# Patient Record
Sex: FEMALE | Race: OTHER | HISPANIC OR LATINO | Employment: FULL TIME | URBAN - METROPOLITAN AREA
[De-identification: names, ages, dates, MRNs, and addresses within clinical notes are randomized per-mention and may not be internally consistent; named-entity substitution may affect disease eponyms.]

---

## 2017-09-20 ENCOUNTER — GENERIC CONVERSION - ENCOUNTER (OUTPATIENT)
Dept: OTHER | Facility: OTHER | Age: 37
End: 2017-09-20

## 2017-11-06 ENCOUNTER — ALLSCRIPTS OFFICE VISIT (OUTPATIENT)
Dept: OTHER | Facility: OTHER | Age: 37
End: 2017-11-06

## 2017-11-07 NOTE — PROGRESS NOTES
Assessment  1  Encounter for gynecological examination without abnormal finding (V72 31) (Z01 419)    Plan  Encounter for gynecological examination without abnormal finding    · Call [phone] if: You find a new or different kind of lump in your breast ;  Status:Complete;   Done: [id number] 02:52PM   Ordered;For:Encounter for gynecological examination without abnormal finding; Ordered By:Maria Del Carmen Molina;   · Eat a normal well-balanced diet ; Status:Complete;   Done: 66IGW2083 02:52PM   Ordered;For:Encounter for gynecological examination without abnormal finding; Ordered By:Maria Del Carmen Molina;   · Vitamins can help you get daily requirements that your diet may not be giving you ;  Status:Complete;   Done: [id number] 02:52PM   Ordered;For:Encounter for gynecological examination without abnormal finding; Ordered By:Maria Del Carmen Molina;   · We encourage all of our patients to exercise regularly  30 minutes of exercise or physical  activity five or more days a week is recommended for children and adults ;  Status:Complete;   Done: [id number] 02:52PM   Ordered;For:Encounter for gynecological examination without abnormal finding; Ordered By:Maria Del Carmen Molina;   · We recommend that you follow these steps to lower your risk of osteoporosis  ;  Status:Complete;   Done: 55UFT0512 02:52PM   Ordered;For:Encounter for gynecological examination without abnormal finding; Ordered By:Maria Del Carmen Molina;    Discussion/Summary  healthy adult female next cervical cancer screening is due 2021   The patient has the current Goals: Healthy lifestyle  The patent has the current Barriers: None  Patient is able to Self-Care  PATIENT EDUCATION RECORD She was given the following educational materials:  age appropriate care guide  The treatment plan was reviewed with the patient/guardian   The patient/guardian understands and agrees with the treatment plan     Self Referrals: No      Chief Complaint  Patient here for yearly exam       History of Present Illness  GYN HM, Adult Female [facility]: The patient is being seen for a gynecology evaluation  The last health maintenance visit was 1 year(s) ago  Social History: Household members include spouse,-- 1 daughter(s),-- 1 son(s)-- and-- 12 and 6  She is   Work status: working full time-- and-- occupation: teacher, [employer]  General Health: The patient's health since the last visit is described as good  Lifestyle:  She does not exercise regularly  -- She does not use tobacco -- She consumes alcohol -- She denies drug use  Reproductive health:  she reports no menstrual problems  Menstrual history: LMP: the last menstrual period was 10/15/17 -- she uses contraception  For contraception, she has had a tubal occlusion  -- she is sexually active  -- pregnancy history: G 4P 2  Screening: Cervical cancer screening includes a pap smear performed 8/30/16, neg-- and-- human papilloma virus screening performed 8/30/16, neg  Review of Systems  no pelvic pain,-- no pelvic pressure,-- no vaginal pain,-- no vaginal discharge,-- no vaginal itching,-- no vaginal lump or mass,-- no vaginal odor,-- no dysmenorrhea,-- no menorrhagia,-- no dysuria-- and-- no urinary loss of control  Additional findings include had two months were cycles were 35-45 days apart, now normalized  Constitutional: No fever, no chills, feels well, no tiredness, no recent weight gain or loss  ENT: no ear ache, no loss of hearing, no nosebleeds or nasal discharge, no sore throat or hoarseness  Cardiovascular: no complaints of slow or fast heart rate, no chest pain, no palpitations, no leg claudication or lower extremity edema  Respiratory: no complaints of shortness of breath, no wheezing, no dyspnea on exertion, no orthopnea or PND  Breasts: no complaints of breast pain, breast lump or nipple discharge     Gastrointestinal: no complaints of abdominal pain, no constipation, no nausea or diarrhea, no vomiting, no bloody stools  Genitourinary: no complaints of dysuria, no incontinence, no pelvic pain, no dysmenorrhea, no vaginal discharge or abnormal vaginal bleeding  Musculoskeletal: no complaints of arthralgia, no myalgia, no joint swelling or stiffness, no limb pain or swelling  Integumentary: no complaints of skin rash or lesion, no itching or dry skin, no skin wounds  Neurological: no complaints of headache, no confusion, no numbness or tingling, no dizziness or fainting  Active Problems  1  Encounter for gynecological examination without abnormal finding (V72 31) (Z01 419)   2  Screening for HPV (human papillomavirus) (V73 81) (Z11 51)   3  Urinary frequency (788 41) (R35 0)    Surgical History   · History of Tubal Ligation    Family History  Mother    · Family history of malignant neoplasm of breast (V16 3) (Z80 3)    Social History   · Never a smoker    Current Meds   1  No Reported Medications Recorded    Allergies  1  No Known Drug Allergies    Vitals   Recorded: [id number] 02:33PM Recorded: 23PFL7699 27:40YH   Systolic 495    Diastolic 74    Height  5 ft 5 5 in   Weight  200 lb    BMI Calculated  32 78   BSA Calculated  1 99   LMP 15Oct2017      Physical Exam    Constitutional   General appearance: No acute distress, well appearing and well nourished  Genitourinary   External genitalia: Normal and no lesions appreciated  Vagina: Normal, no lesions or dryness appreciated  Urethra: Normal     Urethral meatus: Normal     Bladder: Normal, soft, non-tender and no prolapse or masses appreciated  Cervix: Normal, no palpable masses  Uterus: Normal, non-tender, not enlarged, and no palpable masses  Adnexa/parametria: Normal, non-tender and no fullness or masses appreciated  Chest   Breasts: Normal and no dimpling or skin changes noted         Future Appointments    Date/Time Provider Specialty Site   11/07/2018 09:30 AM Raven Shah MD Obstetrics/Gynecology [facility]     Signatures   Electronically signed by : Mayela Potts MD; Nov 6 2017  2:53PM EST                       (Author)

## 2018-01-12 NOTE — MISCELLANEOUS
Message   Recorded as Task   Date: 09/20/2017 09:23 AM, Created By: Marsha Abdi   Task Name: Follow Up   Assigned To: Yvonne Pyle   Regarding Patient: Nicole Dejesus, Status: In Progress   Myrnamikhail Spring - 20 Sep 2017 9:23 AM     TASK CREATED  Caller: Self; General Medical Question; [phone] (Mobile Phone)  Patient has appt with [facility] on 11/06/2017 for yearly  Patient does have some concerns regarding late periods for last two months  Please call to discuss  Best time to reach patient is after 3:15pm   Vincent Angela - 20 Sep 2017 10:30 AM     TASK IN PROGRESS   Vincent Angela - 20 Sep 2017 3:54 PM     TASK EDITED  Pt had tubal  Pt has regular cycles but periods are delayed till 34 or 35 days for past 3 mos  I told her to monitor cycles till yly in Nov  Nothing to do at this time   Active Problems    1  Encounter for gynecological examination without abnormal finding (V72 31) (Z01 419)   2  Screening for HPV (human papillomavirus) (V73 81) (Z11 51)   3  Urinary frequency (788 41) (R35 0)    Current Meds   1  No Reported Medications Recorded    Allergies    1  No Known Drug Allergies    Signatures   Electronically signed by :  Darleen Stovall, ; Sep 20 2017  3:54PM EST                       (Author)

## 2018-01-12 NOTE — RESULT NOTES
Message   Recorded as Task   Date: 09/02/2016 02:34 PM, Created By: Shanelle Tam   Task Name: Verify Patient Results   Assigned To: Sil Sexton   Regarding Patient: Janice Camacho, Status: Active   CommentDebbora Ebonie - 02 Sep 2016 2:34 PM        Sil Sexton - 06 Sep 2016 1:02 PM     TASK EDITED  Card sent that pap was normal        Signatures   Electronically signed by : Linda Guidry CNM;  Sep  6 2016  1:02PM EST                       (Author)

## 2018-01-13 VITALS
BODY MASS INDEX: 32.14 KG/M2 | WEIGHT: 200 LBS | HEIGHT: 66 IN | SYSTOLIC BLOOD PRESSURE: 118 MMHG | DIASTOLIC BLOOD PRESSURE: 74 MMHG

## 2018-12-28 ENCOUNTER — TELEPHONE (OUTPATIENT)
Dept: OBGYN CLINIC | Age: 38
End: 2018-12-28

## 2018-12-28 DIAGNOSIS — Z12.31 ENCOUNTER FOR SCREENING MAMMOGRAM FOR MALIGNANT NEOPLASM OF BREAST: Primary | ICD-10-CM

## 2018-12-28 NOTE — TELEPHONE ENCOUNTER
Pt needs order for Mammogram   Pt had to reschedule her appointment for today  She is on her cycle  Please advise

## 2019-02-18 ENCOUNTER — HOSPITAL ENCOUNTER (OUTPATIENT)
Dept: MAMMOGRAPHY | Facility: CLINIC | Age: 39
Discharge: HOME | End: 2019-02-18
Payer: COMMERCIAL

## 2019-02-18 VITALS — WEIGHT: 205 LBS | BODY MASS INDEX: 34.16 KG/M2 | HEIGHT: 65 IN

## 2019-02-18 DIAGNOSIS — Z12.31 ENCOUNTER FOR SCREENING MAMMOGRAM FOR MALIGNANT NEOPLASM OF BREAST: ICD-10-CM

## 2019-02-18 PROCEDURE — 77067 SCR MAMMO BI INCL CAD: CPT

## 2019-02-18 PROCEDURE — 77063 BREAST TOMOSYNTHESIS BI: CPT

## 2019-03-04 ENCOUNTER — TELEPHONE (OUTPATIENT)
Dept: OBGYN CLINIC | Facility: CLINIC | Age: 39
End: 2019-03-04

## 2019-03-04 NOTE — TELEPHONE ENCOUNTER
----- Message from Joselyn Conklin MD sent at 3/4/2019 10:36 AM EST -----  Please call Iw[facility]  Additional imaging required

## 2019-03-05 NOTE — TELEPHONE ENCOUNTER
ORESTESOM for pt to cb  Mailed letter to the pt that we are trying to contact her in regards to results

## 2019-03-06 ENCOUNTER — HOSPITAL ENCOUNTER (OUTPATIENT)
Dept: MAMMOGRAPHY | Facility: CLINIC | Age: 39
Discharge: HOME | End: 2019-03-06
Payer: COMMERCIAL

## 2019-03-06 ENCOUNTER — ANNUAL EXAM (OUTPATIENT)
Dept: OBGYN CLINIC | Facility: CLINIC | Age: 39
End: 2019-03-06
Payer: COMMERCIAL

## 2019-03-06 ENCOUNTER — HOSPITAL ENCOUNTER (OUTPATIENT)
Dept: ULTRASOUND IMAGING | Facility: CLINIC | Age: 39
Discharge: HOME | End: 2019-03-06
Payer: COMMERCIAL

## 2019-03-06 VITALS
WEIGHT: 205 LBS | SYSTOLIC BLOOD PRESSURE: 120 MMHG | BODY MASS INDEX: 32.95 KG/M2 | HEIGHT: 66 IN | DIASTOLIC BLOOD PRESSURE: 76 MMHG

## 2019-03-06 DIAGNOSIS — Z01.419 ENCOUNTER FOR GYNECOLOGICAL EXAMINATION WITHOUT ABNORMAL FINDING: Primary | ICD-10-CM

## 2019-03-06 DIAGNOSIS — Z12.31 ENCOUNTER FOR SCREENING MAMMOGRAM FOR MALIGNANT NEOPLASM OF BREAST: ICD-10-CM

## 2019-03-06 DIAGNOSIS — R92.8 ABNORMAL MAMMOGRAM: ICD-10-CM

## 2019-03-06 PROCEDURE — 99396 PREV VISIT EST AGE 40-64: CPT | Performed by: NURSE PRACTITIONER

## 2019-03-06 PROCEDURE — 77065 DX MAMMO INCL CAD UNI: CPT

## 2019-03-06 PROCEDURE — G0279 TOMOSYNTHESIS, MAMMO: HCPCS

## 2019-03-06 NOTE — PROGRESS NOTES
Diagnoses and all orders for this visit:    Encounter for gynecological examination without abnormal finding    Encounter for screening mammogram for malignant neoplasm of breast  -     Mammo screening bilateral w 3d & cad; Future          Calcium/vit d inclusion in the diet discussed, call with any issues, SBE reinforced, all concerns addressed  Pleasant 37 y o  premenopausal female here for annual exam  She denies any issues with bleeding or her menses  Reports regular cycles  Denies history of abnormal pap smears  Last Pap 2016, no pap today  Denies vaginal issues  Denies pelvic pain  BTL hx  Sexually active without any concerns  Past Medical History:   Diagnosis Date    No known health problems      Past Surgical History:   Procedure Laterality Date     SECTION      TUBAL LIGATION       Family History   Problem Relation Age of Onset    Breast cancer Mother 61    Colon cancer Neg Hx     Ovarian cancer Neg Hx     Uterine cancer Neg Hx     Cervical cancer Neg Hx      Social History     Tobacco Use    Smoking status: Never Smoker    Smokeless tobacco: Never Used   Substance Use Topics    Alcohol use: Yes     Frequency: Monthly or less    Drug use: Never     No current outpatient medications on file    Patient Active Problem List    Diagnosis Date Noted    Encounter for gynecological examination without abnormal finding 2019    Encounter for screening mammogram for malignant neoplasm of breast 2019       No Known Allergies    OB History    Para Term  AB Living   4 2 2   2 2   SAB TAB Ectopic Multiple Live Births   1       2      # Outcome Date GA Lbr Javier/2nd Weight Sex Delivery Anes PTL Lv   4 AB            3 SAB            2 Term            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    Vitals:    19 1428   BP: 120/76   BP Location: Right arm   Patient Position: Sitting   Weight: 93 kg (205 lb)   Height: 5' 6" (1 676 m)     Body mass index is 33 09 kg/m²  Review of Systems   Constitutional: Negative for chills, fatigue, fever and unexpected weight change  Respiratory: Negative for shortness of breath  Gastrointestinal: Negative for anal bleeding, blood in stool, constipation and diarrhea  Genitourinary: Negative for difficulty urinating, dysuria and hematuria  Physical Exam   Constitutional: She appears well-developed and well-nourished  No distress  HENT:   Head: Normocephalic  Neck: Normal range of motion  Neck supple  Pulmonary: Effort normal   Breasts: bilateral without masses, skin changes or nipple discharge  Bilaterally soft and warm to touch  No areas of erythema or pain  Abdominal: Soft  Pelvic exam was performed with patient supine  No labial fusion  There is no rash, tenderness, lesion or injury on the right labia  There is no rash, tenderness, lesion or injury on the left labia  Uterus is not deviated, not enlarged, not fixed and not tender  Cervix exhibits no motion tenderness, no discharge and no friability  Right adnexum displays no mass, no tenderness and no fullness  Left adnexum displays no mass, no tenderness and no fullness  No erythema or tenderness in the vagina  No foreign body in the vagina  No signs of injury around the vagina  No vaginal discharge found  Lymphadenopathy:        Right: No inguinal adenopathy present  Left: No inguinal adenopathy present         USE LONG SPEC

## 2019-03-06 NOTE — PATIENT INSTRUCTIONS
Calcium/Vitamin D Supplement (By mouth)   Calcium (RADU-see-um), Vitamin D (VYE-ta-min D)  Supplies your body with calcium if you need more than you get in your diet  Calcium helps prevent osteoporosis (weak or brittle bones)  Vitamin D helps your body use the calcium  Calcium and vitamin D are minerals that your body needs to work properly  Brand Name(s): Aki-Citrate, Aki-Citrate Plus Vitamin D, Calcet Petites, Calcium 600MG+D, Calcium Citrate +D3 Maximum, Caltrate 600 + D, Citracal + D, Citracal Calcium Citrate Petites with Vitamin D, Citracal Petites, Citracal Ultradense Calcium Citrate, Citracal Ultradense Calcium Citrate Petite w/Vit D, Citrus Calcium with Vitamin D, D-1000, D-2000, D3-400IU   There may be other brand names for this medicine  When This Medicine Should Not Be Used: You should not use this medicine if you have had an allergic reaction to calcium or vitamin D (ergocalciferol)  How to Use This Medicine:   Tablet, Long Acting Tablet, Fizzy Tablet, Liquid Filled Capsule, Chewable Tablet  · Your doctor will tell you how much medicine to use  Do not use more than directed  · Follow the instructions on the medicine label if you are using this medicine without a prescription  Ask your pharmacist or health caregiver if you are not sure how much calcium you should take in one day  · Most calcium supplements should be taken with food, but some kinds of calcium (such as calcium citrate) can be taken with or without food  Ask your health care provider or read the label on the bottle to see if you need to take your specific kind of calcium with food  Drink a full glass of water (8 ounces) with each dose  · If you are using the effervescent (fizzy) tablet, dissolve the tablet in about 6 to 8 ounces of water (3/4 cup to 1 cup)  After the tablet is completely dissolved, drink this mixture right away  Do not save any mixture to take later    · Carefully follow your doctor's instructions about any special diet   · If you need to take more than one dose each a day, take each dose at evenly spaced times, unless your doctor has told you otherwise  If a dose is missed:   · Take a dose as soon as you remember  If it is almost time for your next dose, wait until then and take a regular dose  Do not take extra medicine to make up for a missed dose  How to Store and Dispose of This Medicine:   · Store the medicine in a closed container at room temperature, away from heat, moisture, and direct light  If the effervescent (fizzy) tablet comes packaged in foil, do not open the foil until you are ready to use each tablet  · Ask your pharmacist, doctor, or health caregiver about the best way to dispose of any outdated medicine or medicine no longer needed  · Keep all medicine out of the reach of children  Never share your medicine with anyone  Drugs and Foods to Avoid:   Ask your doctor or pharmacist before using any other medicine, including over-the-counter medicines, vitamins, and herbal products  · Make sure your doctor knows if you are also using other supplements or medicines that contain calcium  Tell your doctor if you are also using gallium nitrate (Ganite®), cellulose sodium phosphate (Calcibind®), or etidronate (Didronel®)  · Calcium can change the way other medicines work if you take them at the same time  If you need to use other medicines, take them at least 2 hours before or 2 hours after you take your calcium supplement  This is particularly important if you are also using phenytoin (Dilantin®) or a tetracycline antibiotic to treat an infection (such as doxycycline, minocycline, Vibramycin®)  · Do not take your calcium supplement with a high-fiber meal (such as bran, whole-grain cereal or bread, fresh fruits)  Do not smoke cigarettes or cigars  Do not drink large amounts of alcohol or caffeine (for example, more than about 8 cups of coffee)    Warnings While Using This Medicine:   · Make sure your doctor knows if you are pregnant or breast feeding, or if you have kidney disease or have ever had kidney stones  Tell your doctor if you have had problems with too much calcium (hypercalcemia) or too little calcium in your blood (hypocalcemia)  Some health problems that can cause hypercalcemia are sarcoidosis, or problems with your parathyroid gland  · You should not use certain brands of this medicine if you have kidney disease or are on dialysis, because they may harm your kidneys  Ask your caregiver what brands are best for you  · Some health problems can affect how much calcium you should take  Tell your doctor if you have stomach or digestion problems, such as on-going diarrhea, not absorbing nutrients properly, or not having enough acid in your stomach  · This medicine might contain phenylalanine (aspartame)  This is only a concern if you have a disorder called phenylketonuria (a problem with amino acids)  If you have this condition, talk to your doctor before using this medicine  · If you are using a large amount of calcium or using it for a long time, your doctor might need to check your blood on a regular basis  Be sure to keep all appointments  Possible Side Effects While Using This Medicine:   Call your doctor right away if you notice any of these side effects:  · Headache that will not go away, dry mouth, loss of appetite, severe constipation  If you notice other side effects that you think are caused by this medicine, tell your doctor  Call your doctor for medical advice about side effects  You may report side effects to FDA at 1-766-FDA-1243  © 2017 [address] Information is for End User's use only and may not be sold, redistributed or otherwise used for commercial purposes  The above information is an  only  It is not intended as medical advice for individual conditions or treatments   Talk to your doctor, nurse or pharmacist before following any medical regimen to see if it is safe and effective for you

## 2020-03-02 DIAGNOSIS — Z12.31 VISIT FOR SCREENING MAMMOGRAM: Primary | ICD-10-CM

## 2020-07-09 ENCOUNTER — HOSPITAL ENCOUNTER (OUTPATIENT)
Dept: MAMMOGRAPHY | Facility: CLINIC | Age: 40
Discharge: HOME | End: 2020-07-09
Payer: COMMERCIAL

## 2020-07-09 VITALS — BODY MASS INDEX: 32.95 KG/M2 | HEIGHT: 66 IN | WEIGHT: 205 LBS

## 2020-07-09 DIAGNOSIS — Z12.31 VISIT FOR SCREENING MAMMOGRAM: ICD-10-CM

## 2020-07-09 PROCEDURE — 77063 BREAST TOMOSYNTHESIS BI: CPT

## 2020-07-09 PROCEDURE — 77067 SCR MAMMO BI INCL CAD: CPT

## 2020-07-13 ENCOUNTER — ANNUAL EXAM (OUTPATIENT)
Dept: OBGYN CLINIC | Facility: CLINIC | Age: 40
End: 2020-07-13
Payer: COMMERCIAL

## 2020-07-13 VITALS
HEIGHT: 66 IN | WEIGHT: 190 LBS | BODY MASS INDEX: 30.53 KG/M2 | SYSTOLIC BLOOD PRESSURE: 120 MMHG | DIASTOLIC BLOOD PRESSURE: 62 MMHG

## 2020-07-13 DIAGNOSIS — Z01.419 ENCOUNTER FOR GYNECOLOGICAL EXAMINATION WITHOUT ABNORMAL FINDING: Primary | ICD-10-CM

## 2020-07-13 DIAGNOSIS — Z12.31 ENCOUNTER FOR SCREENING MAMMOGRAM FOR MALIGNANT NEOPLASM OF BREAST: ICD-10-CM

## 2020-07-13 PROCEDURE — 99396 PREV VISIT EST AGE 40-64: CPT | Performed by: NURSE PRACTITIONER

## 2020-07-13 NOTE — PROGRESS NOTES
Diagnoses and all orders for this visit:    Encounter for gynecological examination without abnormal finding    Encounter for screening mammogram for malignant neoplasm of breast          Calcium/vit d inclusion in the diet discussed, call with any issues, SBE reinforced, all concerns addressed  Pleasant 41 y o  premenopausal female here for annual exam  She denies any issues with bleeding or her menses  Reports regular cycles  Denies history of abnormal pap smears  Last Pap 2016, no pap today  Denies vaginal issues  Denies pelvic pain  BTL hx  Sexually active without any concerns   and monogamous  Past Medical History:   Diagnosis Date    No known health problems      Past Surgical History:   Procedure Laterality Date     SECTION      CHOLECYSTECTOMY      TUBAL LIGATION       Family History   Problem Relation Age of Onset    Breast cancer Mother 61    No Known Problems Maternal Grandmother     No Known Problems Paternal Grandmother     No Known Problems Maternal Aunt     No Known Problems Maternal Aunt     No Known Problems Paternal Aunt     No Known Problems Paternal Aunt     No Known Problems Paternal Aunt     Colon cancer Neg Hx     Ovarian cancer Neg Hx     Uterine cancer Neg Hx     Cervical cancer Neg Hx      Social History     Tobacco Use    Smoking status: Never Smoker    Smokeless tobacco: Never Used   Substance Use Topics    Alcohol use: Yes     Frequency: Monthly or less    Drug use: Never     No current outpatient medications on file    Patient Active Problem List    Diagnosis Date Noted    Encounter for gynecological examination without abnormal finding 2019    Encounter for screening mammogram for malignant neoplasm of breast 2019       No Known Allergies    OB History    Para Term  AB Living   4 2 2   2 2   SAB TAB Ectopic Multiple Live Births   1       2      # Outcome Date GA Lbr Javier/2nd Weight Sex Delivery Anes PTL Lv 4 AB            3 SAB            2 Term            1 Term              [address] [employer]  2 sons 6 and 14 yo    Vitals:    07/13/20 1028   BP: 120/62   BP Location: Right arm   Patient Position: Sitting   Weight: 86 2 kg (190 lb)   Height: 5' 6" (1 676 m)     Body mass index is 30 67 kg/m²  Review of Systems   Constitutional: Negative for chills, fatigue, fever and unexpected weight change  Respiratory: Negative for shortness of breath  Gastrointestinal: Negative for anal bleeding, blood in stool, constipation and diarrhea  Genitourinary: Negative for difficulty urinating, dysuria and hematuria  Physical Exam   Constitutional: She appears well-developed and well-nourished  No distress  HENT:   Head: Normocephalic  Neck: Normal range of motion  Neck supple  Pulmonary: Effort normal   Breasts: bilateral without masses, skin changes or nipple discharge  Bilaterally soft and warm to touch  No areas of erythema or pain  Abdominal: Soft  Pelvic exam was performed with patient supine  No labial fusion  There is no rash, tenderness, lesion or injury on the right labia  There is no rash, tenderness, lesion or injury on the left labia  Uterus is not deviated, not enlarged, not fixed and not tender  Cervix exhibits no motion tenderness, no discharge and no friability  Right adnexum displays no mass, no tenderness and no fullness  Left adnexum displays no mass, no tenderness and no fullness  No erythema or tenderness in the vagina  No foreign body in the vagina  No signs of injury around the vagina  No vaginal discharge found  Lymphadenopathy:        Right: No inguinal adenopathy present  Left: No inguinal adenopathy present       USE LONG SPEC

## 2020-07-13 NOTE — PATIENT INSTRUCTIONS
Calcium/Vitamin D Supplement (By mouth)   Calcium (RADU-see-um), Vitamin D (VYE-ta-min D)  Supplies your body with calcium if you need more than you get in your diet  Calcium helps prevent osteoporosis (weak or brittle bones)  Vitamin D helps your body use the calcium  Calcium and vitamin D are minerals that your body needs to work properly  Brand Name(s): Aki-Citrate, Aki-Citrate Plus Vitamin D, Calcet Petites, Calcium 600MG+D, Calcium Citrate +D3 Maximum, Caltrate 600 + D, Citracal + D, Citracal Calcium Citrate Petites with Vitamin D, Citracal Petites, Citracal Ultradense Calcium Citrate, Citracal Ultradense Calcium Citrate Petite w/Vit D, Citrus Calcium with Vitamin D, D-1000, D-2000, D3-400IU   There may be other brand names for this medicine  When This Medicine Should Not Be Used: You should not use this medicine if you have had an allergic reaction to calcium or vitamin D (ergocalciferol)  How to Use This Medicine:   Tablet, Long Acting Tablet, Fizzy Tablet, Liquid Filled Capsule, Chewable Tablet  · Your doctor will tell you how much medicine to use  Do not use more than directed  · Follow the instructions on the medicine label if you are using this medicine without a prescription  Ask your pharmacist or health caregiver if you are not sure how much calcium you should take in one day  · Most calcium supplements should be taken with food, but some kinds of calcium (such as calcium citrate) can be taken with or without food  Ask your health care provider or read the label on the bottle to see if you need to take your specific kind of calcium with food  Drink a full glass of water (8 ounces) with each dose  · If you are using the effervescent (fizzy) tablet, dissolve the tablet in about 6 to 8 ounces of water (3/4 cup to 1 cup)  After the tablet is completely dissolved, drink this mixture right away  Do not save any mixture to take later    · Carefully follow your doctor's instructions about any special diet   · If you need to take more than one dose each a day, take each dose at evenly spaced times, unless your doctor has told you otherwise  If a dose is missed:   · Take a dose as soon as you remember  If it is almost time for your next dose, wait until then and take a regular dose  Do not take extra medicine to make up for a missed dose  How to Store and Dispose of This Medicine:   · Store the medicine in a closed container at room temperature, away from heat, moisture, and direct light  If the effervescent (fizzy) tablet comes packaged in foil, do not open the foil until you are ready to use each tablet  · Ask your pharmacist, doctor, or health caregiver about the best way to dispose of any outdated medicine or medicine no longer needed  · Keep all medicine out of the reach of children  Never share your medicine with anyone  Drugs and Foods to Avoid:   Ask your doctor or pharmacist before using any other medicine, including over-the-counter medicines, vitamins, and herbal products  · Make sure your doctor knows if you are also using other supplements or medicines that contain calcium  Tell your doctor if you are also using gallium nitrate (Ganite®), cellulose sodium phosphate (Calcibind®), or etidronate (Didronel®)  · Calcium can change the way other medicines work if you take them at the same time  If you need to use other medicines, take them at least 2 hours before or 2 hours after you take your calcium supplement  This is particularly important if you are also using phenytoin (Dilantin®) or a tetracycline antibiotic to treat an infection (such as doxycycline, minocycline, Vibramycin®)  · Do not take your calcium supplement with a high-fiber meal (such as bran, whole-grain cereal or bread, fresh fruits)  Do not smoke cigarettes or cigars  Do not drink large amounts of alcohol or caffeine (for example, more than about 8 cups of coffee)    Warnings While Using This Medicine:   · Make sure your doctor knows if you are pregnant or breast feeding, or if you have kidney disease or have ever had kidney stones  Tell your doctor if you have had problems with too much calcium (hypercalcemia) or too little calcium in your blood (hypocalcemia)  Some health problems that can cause hypercalcemia are sarcoidosis, or problems with your parathyroid gland  · You should not use certain brands of this medicine if you have kidney disease or are on dialysis, because they may harm your kidneys  Ask your caregiver what brands are best for you  · Some health problems can affect how much calcium you should take  Tell your doctor if you have stomach or digestion problems, such as on-going diarrhea, not absorbing nutrients properly, or not having enough acid in your stomach  · This medicine might contain phenylalanine (aspartame)  This is only a concern if you have a disorder called phenylketonuria (a problem with amino acids)  If you have this condition, talk to your doctor before using this medicine  · If you are using a large amount of calcium or using it for a long time, your doctor might need to check your blood on a regular basis  Be sure to keep all appointments  Possible Side Effects While Using This Medicine:   Call your doctor right away if you notice any of these side effects:  · Headache that will not go away, dry mouth, loss of appetite, severe constipation  If you notice other side effects that you think are caused by this medicine, tell your doctor  Call your doctor for medical advice about side effects  You may report side effects to FDA at 1-811-FDA-0858  © 2017 [employer] Information is for End User's use only and may not be sold, redistributed or otherwise used for commercial purposes  The above information is an  only  It is not intended as medical advice for individual conditions or treatments   Talk to your doctor, nurse or pharmacist before following any medical regimen to see if it is safe and effective for you

## 2021-03-10 DIAGNOSIS — Z23 ENCOUNTER FOR IMMUNIZATION: ICD-10-CM

## 2021-07-19 ENCOUNTER — ANNUAL EXAM (OUTPATIENT)
Dept: OBGYN CLINIC | Facility: CLINIC | Age: 41
End: 2021-07-19
Payer: COMMERCIAL

## 2021-07-19 VITALS
WEIGHT: 207 LBS | SYSTOLIC BLOOD PRESSURE: 110 MMHG | HEIGHT: 66 IN | DIASTOLIC BLOOD PRESSURE: 84 MMHG | BODY MASS INDEX: 33.27 KG/M2

## 2021-07-19 DIAGNOSIS — Z12.31 SCREENING MAMMOGRAM, ENCOUNTER FOR: ICD-10-CM

## 2021-07-19 DIAGNOSIS — Z01.419 ENCOUNTER FOR GYNECOLOGICAL EXAMINATION WITHOUT ABNORMAL FINDING: Primary | ICD-10-CM

## 2021-07-19 PROCEDURE — 99396 PREV VISIT EST AGE 40-64: CPT | Performed by: NURSE PRACTITIONER

## 2021-07-19 PROCEDURE — G0145 SCR C/V CYTO,THINLAYER,RESCR: HCPCS | Performed by: NURSE PRACTITIONER

## 2021-07-19 PROCEDURE — G0476 HPV COMBO ASSAY CA SCREEN: HCPCS | Performed by: NURSE PRACTITIONER

## 2021-07-19 NOTE — PATIENT INSTRUCTIONS
Breast Self Exam for Women   AMBULATORY CARE:   A breast self-exam (BSE)  is a way to check your breasts for lumps and other changes  Regular BSEs can help you know how your breasts normally look and feel  Most breast lumps or changes are not cancer, but you should always have them checked by a healthcare provider  Why you should do a BSE:  Breast cancer is the most common type of cancer in women  Even if you have mammograms, you may still want to do a BSE regularly  If you know how your breasts normally feel and look, it may help you know when to contact your healthcare provider  Mammograms can miss some cancers  You may find a lump during a BSE that did not show up on a mammogram   When you should do a BSE:  If you have periods, you may want to do your BSE 1 week after your period ends  This is the time when your breasts may be the least swollen, lumpy, or tender  You can do regular BSEs even if you are breastfeeding or have breast implants  Call your doctor if:   · You find any lumps or changes in your breasts  · You have breast pain or fluid coming from your nipples  · You have questions or concerns about your condition or care  How to do a BSE:       · Look at your breasts in a mirror  Look at the size and shape of each breast and nipple  Check for swelling, lumps, dimpling, scaly skin, or other skin changes  Look for nipple changes, such as a nipple that is painful or beginning to pull inward  Gently squeeze both nipples and check to see if fluid (that is not breast milk) comes out of them  If you find any of these or other breast changes, contact your healthcare provider  Check your breasts while you sit or  the following 3 positions:    ? Hang your arms down at your sides  ? Raise your hands and join them behind your head  ? Put firm pressure with your hands on your hips  Bend slightly forward while you look at your breasts in the mirror  · Lie down and feel your breasts    When you lie down, your breast tissue spreads out evenly over your chest  This makes it easier for you to feel for lumps and anything that may not be normal for your breasts  Do a BSE on one breast at a time  ? Place a small pillow or towel under your left shoulder  Put your left arm behind your head  ? Use the 3 middle fingers of your right hand  Use your fingertip pads, on the top of your fingers  Your fingertip pad is the most sensitive part of your finger  ? Use small circles to feel your breast tissue  Use your fingertip pads to make dime-sized, overlapping circles on your breast and armpits  Use light, medium, and firm pressure  First, press lightly  Second, press with medium pressure to feel a little deeper into the breast  Last, use firm pressure to feel deep within your breast     ? Examine your entire breast area  Examine the breast area from above the breast to below the breast where you feel only ribs  Make small circles with your fingertips, starting in the middle of your armpit  Make circles going up and down the breast area  Continue toward your breast and all the way across it  Examine the area from your armpit all the way over to the middle of your chest (breastbone)  Stop at the middle of your chest     ? Move the pillow or towel to your right shoulder, and put your right arm behind your head  Use the 3 fingertip pads of your left hand, and repeat the above steps to do a BSE on your right breast   What else you can do to check for breast problems or cancer:  Talk to your healthcare provider about mammograms  A mammogram is an x-ray of your breasts to screen for breast cancer or other problems  Your provider can tell you the benefits and risks of mammograms  The first mammogram is usually at age 39 or 48  Your provider may recommend you start at 36 or younger if your risk for breast cancer is high  Mammograms usually continue every 1 to 2 years until age 76       Follow up with your doctor as directed:  Write down your questions so you remember to ask them during your visits  © Copyright [email or web address] 2021 Information is for End User's use only and may not be sold, redistributed or otherwise used for commercial purposes  All illustrations and images included in CareNotes® are the copyrighted property of [employer]  or [employer]  The above information is an  only  It is not intended as medical advice for individual conditions or treatments  Talk to your doctor, nurse or pharmacist before following any medical regimen to see if it is safe and effective for you

## 2021-07-19 NOTE — PROGRESS NOTES
Diagnoses and all orders for this visit:    Encounter for gynecological examination without abnormal finding  -     Liquid-based pap, screening    Screening mammogram, encounter for  -     Mammo screening bilateral w 3d & cad; Future      Calcium/vit d inclusion in the diet discussed, call with any issues, SBE reinforced, all concerns addressed  Pleasant 35 y o  premenopausal female here for annual exam  She denies any issues with bleeding or her menses  Reports regular cycles, q 28-30d  Denies history of abnormal pap smears  Last Pap 2016 neg and HPV neg, pap and cotest done today  Denies vaginal issues  Denies pelvic pain  BTL hx  Sexually active without any concerns   and monogamous  Past Medical History:   Diagnosis Date    No known health problems      Past Surgical History:   Procedure Laterality Date     SECTION      CHOLECYSTECTOMY      TUBAL LIGATION       Family History   Problem Relation Age of Onset    Breast cancer Mother 61    No Known Problems Maternal Grandmother     No Known Problems Paternal Grandmother     No Known Problems Maternal Aunt     No Known Problems Maternal Aunt     No Known Problems Paternal Aunt     No Known Problems Paternal Aunt     No Known Problems Paternal Aunt     Colon cancer Neg Hx     Ovarian cancer Neg Hx     Uterine cancer Neg Hx     Cervical cancer Neg Hx      Social History     Tobacco Use    Smoking status: Never Smoker    Smokeless tobacco: Never Used   Vaping Use    Vaping Use: Never used   Substance Use Topics    Alcohol use:  Yes    Drug use: Never       Current Outpatient Medications:     Multiple Vitamins-Minerals (WOMENS MULTIVITAMIN PLUS PO), Take by mouth, Disp: , Rfl:   Patient Active Problem List    Diagnosis Date Noted    Encounter for gynecological examination without abnormal finding 2019       No Known Allergies    OB History    Para Term  AB Living   4 2 2   2 2   SAB TAB Ectopic Multiple Live Births   1       2      # Outcome Date GA Lbr Javier/2nd Weight Sex Delivery Anes PTL Lv   4 AB            3 SAB            2 Term            1 Term              [employer]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    Vitals:    07/19/21 1003   BP: 110/84   BP Location: Left arm   Patient Position: Sitting   Cuff Size: Standard   Weight: 93 9 kg (207 lb)   Height: 5' 6" (1 676 m)     Body mass index is 33 41 kg/m²  Review of Systems   Constitutional: Negative for chills, fatigue, fever and unexpected weight change  Respiratory: Negative for shortness of breath  Gastrointestinal: Negative for anal bleeding, blood in stool, constipation and diarrhea  Genitourinary: Negative for difficulty urinating, dysuria and hematuria  Physical Exam   Constitutional: She appears well-developed and well-nourished  No distress  HENT:   Head: Normocephalic  Neck: Normal range of motion  Neck supple  Pulmonary: Effort normal   Breasts: bilateral without masses, skin changes or nipple discharge  Bilaterally soft and warm to touch  No areas of erythema or pain  Abdominal: Soft  Pelvic exam was performed with patient supine  No labial fusion  There is no rash, tenderness, lesion or injury on the right labia  There is no rash, tenderness, lesion or injury on the left labia  Uterus is not deviated, not enlarged, not fixed and not tender  Cervix exhibits no motion tenderness, no discharge and no friability  Right adnexum displays no mass, no tenderness and no fullness  Left adnexum displays no mass, no tenderness and no fullness  No erythema or tenderness in the vagina  No foreign body in the vagina  No signs of injury around the vagina  No vaginal discharge found  Lymphadenopathy:        Right: No inguinal adenopathy present  Left: No inguinal adenopathy present     USE LONG SPEC

## 2021-07-22 LAB
HPV HR 12 DNA CVX QL NAA+PROBE: NEGATIVE
HPV16 DNA CVX QL NAA+PROBE: NEGATIVE
HPV18 DNA CVX QL NAA+PROBE: NEGATIVE

## 2021-07-26 LAB
LAB AP GYN PRIMARY INTERPRETATION: NORMAL
Lab: NORMAL

## 2021-08-11 ENCOUNTER — HOSPITAL ENCOUNTER (OUTPATIENT)
Dept: MAMMOGRAPHY | Facility: HOSPITAL | Age: 41
Discharge: HOME | End: 2021-08-11
Payer: COMMERCIAL

## 2021-08-11 VITALS — HEIGHT: 66 IN | WEIGHT: 207 LBS | BODY MASS INDEX: 33.27 KG/M2

## 2021-08-11 DIAGNOSIS — Z12.31 SCREENING MAMMOGRAM, ENCOUNTER FOR: ICD-10-CM

## 2021-08-11 PROCEDURE — 77063 BREAST TOMOSYNTHESIS BI: CPT

## 2021-08-11 PROCEDURE — 77067 SCR MAMMO BI INCL CAD: CPT

## 2022-07-20 ENCOUNTER — ANNUAL EXAM (OUTPATIENT)
Dept: OBGYN CLINIC | Facility: CLINIC | Age: 42
End: 2022-07-20
Payer: COMMERCIAL

## 2022-07-20 VITALS
BODY MASS INDEX: 33.27 KG/M2 | WEIGHT: 207 LBS | DIASTOLIC BLOOD PRESSURE: 82 MMHG | HEIGHT: 66 IN | SYSTOLIC BLOOD PRESSURE: 118 MMHG

## 2022-07-20 DIAGNOSIS — Z12.31 SCREENING MAMMOGRAM, ENCOUNTER FOR: ICD-10-CM

## 2022-07-20 DIAGNOSIS — Z01.419 ENCOUNTER FOR GYNECOLOGICAL EXAMINATION WITHOUT ABNORMAL FINDING: Primary | ICD-10-CM

## 2022-07-20 PROCEDURE — 0503F POSTPARTUM CARE VISIT: CPT | Performed by: NURSE PRACTITIONER

## 2022-07-20 PROCEDURE — 99396 PREV VISIT EST AGE 40-64: CPT | Performed by: NURSE PRACTITIONER

## 2022-07-20 NOTE — PATIENT INSTRUCTIONS
Breast Self Exam for Women   AMBULATORY CARE:   A breast self-exam (BSE)  is a way to check your breasts for lumps and other changes  Regular BSEs can help you know how your breasts normally look and feel  Most breast lumps or changes are not cancer, but you should always have them checked by a healthcare provider  Why you should do a BSE:  Breast cancer is the most common type of cancer in women  Even if you have mammograms, you may still want to do a BSE regularly  If you know how your breasts normally feel and look, it may help you know when to contact your healthcare provider  Mammograms can miss some cancers  You may find a lump during a BSE that did not show up on a mammogram   When you should do a BSE:  If you have periods, you may want to do your BSE 1 week after your period ends  This is the time when your breasts may be the least swollen, lumpy, or tender  You can do regular BSEs even if you are breastfeeding or have breast implants  Call your doctor if:   You find any lumps or changes in your breasts  You have breast pain or fluid coming from your nipples  You have questions or concerns about your condition or care  How to do a BSE:       Look at your breasts in a mirror  Look at the size and shape of each breast and nipple  Check for swelling, lumps, dimpling, scaly skin, or other skin changes  Look for nipple changes, such as a nipple that is painful or beginning to pull inward  Gently squeeze both nipples and check to see if fluid (that is not breast milk) comes out of them  If you find any of these or other breast changes, contact your healthcare provider  Check your breasts while you sit or  the following 3 positions:    Halie Barrack your arms down at your sides  Raise your hands and join them behind your head  Put firm pressure with your hands on your hips  Bend slightly forward while you look at your breasts in the mirror  Lie down and feel your breasts    When you lie down, your breast tissue spreads out evenly over your chest  This makes it easier for you to feel for lumps and anything that may not be normal for your breasts  Do a BSE on one breast at a time  Place a small pillow or towel under your left shoulder  Put your left arm behind your head  Use the 3 middle fingers of your right hand  Use your fingertip pads, on the top of your fingers  Your fingertip pad is the most sensitive part of your finger  Use small circles to feel your breast tissue  Use your fingertip pads to make dime-sized, overlapping circles on your breast and armpits  Use light, medium, and firm pressure  First, press lightly  Second, press with medium pressure to feel a little deeper into the breast  Last, use firm pressure to feel deep within your breast     Examine your entire breast area  Examine the breast area from above the breast to below the breast where you feel only ribs  Make small circles with your fingertips, starting in the middle of your armpit  Make circles going up and down the breast area  Continue toward your breast and all the way across it  Examine the area from your armpit all the way over to the middle of your chest (breastbone)  Stop at the middle of your chest     Move the pillow or towel to your right shoulder, and put your right arm behind your head  Use the 3 fingertip pads of your left hand, and repeat the above steps to do a BSE on your right breast     What else you can do to check for breast problems or cancer:  Talk to your healthcare provider about mammograms  A mammogram is an x-ray of your breasts to screen for breast cancer or other problems  Your provider can tell you the benefits and risks of mammograms  The first mammogram is usually at age 39 or 48  Your provider may recommend you start at 36 or younger if your risk for breast cancer is high  Mammograms usually continue every 1 to 2 years until age 76         Follow up with your doctor as directed:  Write down your questions so you remember to ask them during your visits  © Copyright [employer] 2022 Information is for End User's use only and may not be sold, redistributed or otherwise used for commercial purposes  All illustrations and images included in CareNotes® are the copyrighted property of [employer]  or Nancy Coronel  The above information is an  only  It is not intended as medical advice for individual conditions or treatments  Talk to your doctor, nurse or pharmacist before following any medical regimen to see if it is safe and effective for you

## 2022-07-20 NOTE — PROGRESS NOTES
Patient presents for: Irina Perez                              Menarche- 11  Last Pap Smear- 07/19/2021  Hx of abnormal paps-never  Birth control- TUBAL    Mammogram- 08/11/2021  DXA-NOT ON FILE   Colonoscopy- NOT ON FILE    Smoking status- NEVER   Last sexual activity-YES  Family history of uterine, ovarian, cervical or breast cancer-  MOTHER BREAST CANCER  Concerns- no questions or concerns

## 2022-07-20 NOTE — PROGRESS NOTES
Diagnoses and all orders for this visit:    Encounter for gynecological examination without abnormal finding    Screening mammogram, encounter for  -     Mammo screening bilateral w 3d & cad; Future      Calcium/vit d inclusion in the diet discussed, call with any issues, SBE reinforced, all concerns addressed  Pleasant 38 y o  premenopausal female here for annual exam  She denies any issues with bleeding or her menses  Reports regular cycles, q 28-30 d, they last about 5 days  Denies history of abnormal pap smears  Last Pap in  neg and HPV neg, NO pap done today  Denies vaginal issues  Denies pelvic pain or dyspareunia  BTL hx  Sexually active without any concerns   and monogamous  Past Medical History:   Diagnosis Date    No known health problems      Past Surgical History:   Procedure Laterality Date     SECTION      CHOLECYSTECTOMY      TUBAL LIGATION       Family History   Problem Relation Age of Onset    Breast cancer Mother 61    No Known Problems Maternal Grandmother     No Known Problems Paternal Grandmother     No Known Problems Maternal Aunt     No Known Problems Maternal Aunt     No Known Problems Paternal Aunt     No Known Problems Paternal Aunt     No Known Problems Paternal Aunt     No Known Problems Son     No Known Problems Son     Colon cancer Neg Hx     Ovarian cancer Neg Hx     Uterine cancer Neg Hx     Cervical cancer Neg Hx      Social History     Tobacco Use    Smoking status: Never Smoker    Smokeless tobacco: Never Used   Vaping Use    Vaping Use: Never used   Substance Use Topics    Alcohol use:  Yes    Drug use: Never       Current Outpatient Medications:     Multiple Vitamins-Minerals (WOMENS MULTIVITAMIN PLUS PO), Take by mouth, Disp: , Rfl:   Patient Active Problem List    Diagnosis Date Noted    Family history of diabetes mellitus 10/12/2009       No Known Allergies    OB History    Para Term  AB Living   4 2 2   2 2 SAB IAB Ectopic Multiple Live Births   1       2      # Outcome Date GA Lbr Javier/2nd Weight Sex Delivery Anes PTL Lv   4 AB            3 SAB            2 Term            1 Term              [employer]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)  Going to [place] and [place] next week    Vitals:    07/20/22 0935   BP: 118/82   BP Location: Left arm   Patient Position: Sitting   Cuff Size: Standard   Weight: 93 9 kg (207 lb)   Height: 5' 6" (1 676 m)     Body mass index is 33 41 kg/m²  Review of Systems   Constitutional: Negative for chills, fatigue, fever and unexpected weight change  Respiratory: Negative for shortness of breath  Gastrointestinal: Negative for anal bleeding, blood in stool, constipation and diarrhea  Genitourinary: Negative for difficulty urinating, dysuria and hematuria  Physical Exam   Constitutional: She appears well-developed and well-nourished  No distress  HENT:   Head: Normocephalic  Neck: Normal range of motion  Neck supple  Pulmonary: Effort normal   Breasts: bilateral without masses, skin changes or nipple discharge  Bilaterally soft and warm to touch  No areas of erythema or pain  Abdominal: Soft  Pelvic exam was performed with patient supine  No labial fusion  There is no rash, tenderness, lesion or injury on the right labia  There is no rash, tenderness, lesion or injury on the left labia  Uterus is not deviated, not enlarged, not fixed and not tender  Cervix exhibits no motion tenderness, no discharge and no friability  Right adnexum displays no mass, no tenderness and no fullness  Left adnexum displays no mass, no tenderness and no fullness  No erythema or tenderness in the vagina  No foreign body in the vagina  No signs of injury around the vagina  No vaginal discharge found  Lymphadenopathy:        Right: No inguinal adenopathy present  Left: No inguinal adenopathy present     USE BLUE LONG SPEC

## 2022-10-25 ENCOUNTER — HOSPITAL ENCOUNTER (OUTPATIENT)
Dept: MAMMOGRAPHY | Facility: HOSPITAL | Age: 42
Discharge: HOME | End: 2022-10-25
Payer: COMMERCIAL

## 2022-10-25 VITALS — HEIGHT: 66 IN | WEIGHT: 207.01 LBS | BODY MASS INDEX: 33.27 KG/M2

## 2022-10-25 DIAGNOSIS — Z12.31 SCREENING MAMMOGRAM, ENCOUNTER FOR: ICD-10-CM

## 2022-10-25 PROCEDURE — 77067 SCR MAMMO BI INCL CAD: CPT

## 2022-10-25 PROCEDURE — 77063 BREAST TOMOSYNTHESIS BI: CPT

## 2023-10-30 ENCOUNTER — TELEPHONE (OUTPATIENT)
Dept: OBGYN CLINIC | Facility: CLINIC | Age: 43
End: 2023-10-30

## 2023-10-30 DIAGNOSIS — Z12.31 SCREENING MAMMOGRAM, ENCOUNTER FOR: Primary | ICD-10-CM

## 2023-10-30 NOTE — TELEPHONE ENCOUNTER
Patient would like a mammogram order entered prior to her annual that is scheduled 11/7/23 with [facility] so that she can get it scheduled. Thank you!

## 2023-11-06 NOTE — PROGRESS NOTES
Diagnoses and all orders for this visit:    Encounter for gynecological examination without abnormal finding    Colon cancer screening    Encounter for screening mammogram for malignant neoplasm of breast  -     Mammo screening bilateral w 3d & cad; Future        Perineal hygiene reviewed   Weight bearing exercises minium of 150 mins/weekly advised. Kegel exercises recommended daily, see AVS for instructions and recommendations  SBE encouraged, ASCCP guidelines reviewed. Condoms encouraged with all sexual activity to prevent STI's. Gardisil vaccines recommended up to age 39  Calcium/ Vit D dietary requirements discussed,   Advised to call with any issues,  all concerns & questions addressed. See provided information in your after visit summary     F/U Annually and PRN      Health Maintenance:    Last PAP: 07/19/2021 Neg/Neg  Next PAP Due: 2024    Last Mammogram: 10/25/2022    Life time Becky Clarke % 17.98, Density B scattered areas of fibroglandular density , Bi-Rads 2 Benign  Next Mammogram: ordered     Last Colonoscopy: Not on file   has order from primary, needs to schedule    Gardisil: Not completed       Subjective    CC: Yearly Exam      Latia Nath is a 37 y.o. female here for an annual exam. [id number]  GYN hx includes: 1 c section, 1 vaginal, tubal  No personal Hx of breast, cervical, ovarian or colon CA. Family hx of: mother with [facility] [place] colon cancer    Medically stable, reports no changes in medical Hx, follows with PMD    Patient's last menstrual period was 10/10/2023 (exact date). Her menstrual cycles are regular every 28-30 days. She denies issues with bleeding during her menses. Denies history of abnormal pap smear. She denies breast concerns, abnormal vaginal discharge, vaginal itching, odor, irritation, bowel/bladder dysfunction, urinary symptoms, pelvic pain, or dyspareunia today. Occasional urge incontinence, we discussed Kegel's and b;adder retraining briefly   She is sexually active. Monogamous relationship. Her current method of contraception includes  tubal. Denies any issues with her BCM. She does not want STD testing today.   Denies intimate partner violence    Teacher in [place]     Past Medical History:   Diagnosis Date    No known health problems     Varicella      Past Surgical History:   Procedure Laterality Date     SECTION      CHOLECYSTECTOMY      TUBAL LIGATION         Immunization History   Administered Date(s) Administered    COVID-19 MODERNA VACC 0.5 ML IM 2021, 2021, 2021    COVID-19 Moderna Vac BIVALENT 12 Yr+ IM 0.5 ML 2022       Family History   Problem Relation Age of Onset    Breast cancer Mother 61    No Known Problems Son     No Known Problems Son     No Known Problems Maternal Grandmother     No Known Problems Paternal Grandmother     No Known Problems Maternal Aunt     No Known Problems Maternal Aunt     Colon cancer Paternal Aunt     No Known Problems Paternal Aunt     No Known Problems Paternal Aunt     Ovarian cancer Neg Hx     Uterine cancer Neg Hx     Cervical cancer Neg Hx      Social History     Tobacco Use    Smoking status: Never    Smokeless tobacco: Never   Vaping Use    Vaping Use: Never used   Substance Use Topics    Alcohol use: Yes     Comment: social    Drug use: Never       Current Outpatient Medications:     Multiple Vitamins-Minerals (WOMENS MULTIVITAMIN PLUS PO), Take by mouth, Disp: , Rfl:   Patient Active Problem List    Diagnosis Date Noted    Prediabetes 2023    Family history of diabetes mellitus 10/12/2009       No Known Allergies    OB History    Para Term  AB Living   4 2 2 0 2 2   SAB IAB Ectopic Multiple Live Births   1 0 0 0 2      # Outcome Date GA Lbr Javier/2nd Weight Sex Delivery Anes PTL Lv   4 Term 11 40w0d   M Vag-Spont  N FAVIO   3 Term 05 38w0d   M CS-LTranv   FAVIO   2 AB            1 SAB               Obstetric Comments   1 vaginal  & 1 C- section        Vitals:    23 1023   BP: 120/76   BP Location: Left arm   Patient Position: Sitting   Cuff Size: Large   Weight: 93.9 kg (207 lb)   Height: 5' 6" (1.676 m)     Body mass index is 33.41 kg/m². Review of Systems     Constitutional: Negative for chills, fatigue, fever, headaches, visual disturbances, and unexpected weight change. Respiratory: Negative for cough, & shortness of breath. Cardiovascular: Negative for chest pain. .    Gastrointestinal: Negative for Abd pain, nausea & vomiting, constipation and diarrhea. Genitourinary: Negative for difficulty urinating, dysuria, hematuria, dyspareunia, unusual vaginal bleeding or discharge  Skin: Negative skin changes    Physical Exam     Constitutional: Alert & Oriented x3, well-developed and well-nourished. No distress. HENT: Atraumatic, Normocephalic, Conjunctivae clear  Neck: Normal range of motion. Neck supple. No thyromegaly, mass, nodules or tenderness  Pulmonary: Effort normal.   Abdominal: Soft. No tenderness or masses  Musculoskeletal: Normal ROM  Skin: Warm & Dry  Psychological: Normal mood, thought content, behavior & judgement     Breasts:   Right: tissue soft without masses, tenderness, skin changes or nipple discharge. No areas of erythema or pain. No subclavicular, axillary, pectoral adenopathy  Left:  tissue soft without masses, tenderness, skin changes or nipple discharge. No areas of erythema or pain. No subclavicular, axillary, pectoral adenopathy    Pelvic exam was performed with patient supine, lithotomy position. Labia: Negative rash, tenderness, lesion or injury on the right labia. Negative rash, tenderness, lesion or injury on the left labia. Urethral meatus:  Negative for  tenderness, inflammation or discharge. Uterus: not deviated, enlarged, fixed or tender. Cervix: No CMT, no discharge or friability. Right adnexa: no mass, no tenderness and no fullness. Left adnexa: no mass, no tenderness and no fullness.    Vagina: No erythema, tenderness, masses, or foreign body in the vagina. No signs of injury around the vagina. No unusual vaginal discharge   Perineum without lesions, signs of injury, erythema or swelling. Inguinal Canal:        Right: No inguinal adenopathy or hernia present. Left: No inguinal adenopathy or hernia present.

## 2023-11-06 NOTE — PATIENT INSTRUCTIONS
Urinary Incontinence   WHAT YOU NEED TO KNOW:   What is urinary incontinence (UI)? UI is loss of bladder control. UI develops because your bladder cannot store or empty urine properly. The 3 most common types of UI are stress incontinence, urge incontinence, or both. What are the signs and symptoms of UI? You feel like your bladder does not empty completely when you urinate. You urinate often and need to urinate immediately. You leak urine when you sleep, or you wake up with the urge to urinate. You leak urine when you cough, sneeze, exercise, or laugh. How is UI treated? Medicines  can help strengthen your bladder control. Electrical stimulation  is used to send a small amount of electrical energy to your pelvic floor muscles. This helps control your bladder function. Electrodes may be placed outside your body or in your rectum. For women, the electrodes may be placed in the vagina. A bulking agent  may be injected into the wall of your urethra to make it thicker. This helps keep your urethra closed and decreases urine leakage. Devices  such as a clamp, pessary, or tampon may help stop urine leaks. Ask your healthcare provider for more information about these and other devices. Surgery  may be needed if other treatments do not work. Several types of surgery can help improve your bladder control. Ask your provider for more information about the surgery you may need. How can I manage my symptoms? Do pelvic muscle exercises often. Your pelvic muscles help you stop urinating. Squeeze these muscles tight for 5 seconds, then relax for 5 seconds. Gradually work up to squeezing for 10 seconds. Do 3 sets of 15 repetitions a day, or as directed. This will help strengthen your pelvic muscles and improve bladder control. Keep a UI record. Write down how often you leak urine and how much you leak. Make a note of what you were doing when you leaked urine. Train your bladder. Go to the bathroom at set times, such as every 2 hours, even if you do not feel the urge to go. You can also try to hold your urine when you feel the urge to go. For example, hold your urine for 5 minutes when you feel the urge to go. As that becomes easier, hold your urine for 10 minutes. Drink liquids as directed. Ask your healthcare provider how much liquid to drink each day and which liquids are best for you. You may need to limit the amount of liquid you drink to help control your urine leakage. Do not drink any liquid right before you go to bed. Limit or do not have drinks that contain caffeine or alcohol. Prevent constipation. Eat a variety of high-fiber foods. Good examples are high-fiber cereals, beans, vegetables, and whole-grain breads. Prune juice may help make your bowel movement softer. Walking is the best way to trigger your intestines to have a bowel movement. Exercise regularly and maintain a healthy weight. Ask your provider how much you should weigh and about the best exercise plan for you. Weight loss and exercise will decrease pressure on your bladder and help you control your leakage. Ask your provider to help you create a weight loss plan, if needed. Use a catheter as directed  to help empty your bladder. A catheter is a tiny, plastic tube that is put into your bladder to drain your urine. Your provider may tell you to use a catheter to prevent your bladder from getting too full and leaking urine. Go to behavior therapy as directed. Behavior therapy may be used to help you learn to control your urge to urinate. When should I seek immediate care? You have severe pain. You are confused or cannot think clearly. You see blood in your urine. You have pain when you urinate. You have new or worse pain, even after treatment. When should I call my doctor? You have a fever. Your mouth feels dry or you have vision changes.     Your urine is cloudy or smells bad. You have questions or concerns about your condition or care. CARE AGREEMENT:   You have the right to help plan your care. Learn about your health condition and how it may be treated. Discuss treatment options with your healthcare providers to decide what care you want to receive. You always have the right to refuse treatment. The above information is an  only. It is not intended as medical advice for individual conditions or treatments. Talk to your doctor, nurse or pharmacist before following any medical regimen to see if it is safe and effective for you. © Copyright [place] 2023 Information is for End User's use only and may not be sold, redistributed or otherwise used for commercial purposes. Breast Self Exam for Women   AMBULATORY CARE:   A breast self-exam (BSE)  is a way to check your breasts for lumps and other changes. Regular BSEs can help you know how your breasts normally look and feel. Most breast lumps or changes are not cancer, but you should always have them checked by a healthcare provider. Why you should do a BSE:  Breast cancer is the most common type of cancer in women. Even if you have mammograms, you may still want to do a BSE regularly. If you know how your breasts normally feel and look, it may help you know when to contact your healthcare provider. Mammograms can miss some cancers. You may find a lump during a BSE that did not show up on a mammogram.  When you should do a BSE:  If you have periods, you may want to do your BSE 1 week after your period ends. This is the time when your breasts may be the least swollen, lumpy, or tender. You can do regular BSEs even if you are breastfeeding or have breast implants. Call your doctor if:   You find any lumps or changes in your breasts. You have breast pain or fluid coming from your nipples. You have questions or concerns about your condition or care.     How to do a BSE:       Look at your breasts in a mirror. Look at the size and shape of each breast and nipple. Check for swelling, lumps, dimpling, scaly skin, or other skin changes. Look for nipple changes, such as a nipple that is painful or beginning to pull inward. Gently squeeze both nipples and check to see if fluid (that is not breast milk) comes out of them. If you find any of these or other breast changes, contact your healthcare provider. Check your breasts while you sit or  the following 3 positions:    [place] your arms down at your sides. Raise your hands and join them behind your head. Put firm pressure with your hands on your hips. Bend slightly forward while you look at your breasts in the mirror. Lie down and feel your breasts. When you lie down, your breast tissue spreads out evenly over your chest. This makes it easier for you to feel for lumps and anything that may not be normal for your breasts. Do a BSE on one breast at a time. Place a small pillow or towel under your left shoulder. Put your left arm behind your head. Use the 3 middle fingers of your right hand. Use your fingertip pads, on the top of your fingers. Your fingertip pad is the most sensitive part of your finger. Use small circles to feel your breast tissue. Use your fingertip pads to make dime-sized, overlapping circles on your breast and armpits. Use light, medium, and firm pressure. First, press lightly. Second, press with medium pressure to feel a little deeper into the breast. Last, use firm pressure to feel deep within your breast.    Examine your entire breast area. Examine the breast area from above the breast to below the breast where you feel only ribs. Make small circles with your fingertips, starting in the middle of your armpit. Make circles going up and down the breast area. Continue toward your breast and all the way across it. Examine the area from your armpit all the way over to the middle of your chest (breastbone).  Stop at the middle of your chest.    Move the pillow or towel to your right shoulder, and put your right arm behind your head. Use the 3 fingertip pads of your left hand, and repeat the above steps to do a BSE on your right breast.  What else you can do to check for breast problems or cancer:  Talk to your healthcare provider about mammograms. A mammogram is an x-ray of your breasts to screen for breast cancer or other problems. Your provider can tell you the benefits and risks of mammograms. The first mammogram is usually at age 39 or 48. Your provider may recommend you start at 36 or younger if your risk for breast cancer is high. Mammograms usually continue every 1 to 2 years until age 76. Follow up with your doctor as directed:  Write down your questions so you remember to ask them during your visits. © Copyright [employer] 2023 Information is for End User's use only and may not be sold, redistributed or otherwise used for commercial purposes. The above information is an  only. It is not intended as medical advice for individual conditions or treatments. Talk to your doctor, nurse or pharmacist before following any medical regimen to see if it is safe and effective for you. Wellness Visit for Adults   AMBULATORY CARE:   A wellness visit  is when you see your healthcare provider to get screened for health problems. Your healthcare provider will also give you advice on how to stay healthy. Write down your questions so you remember to ask them. Ask your healthcare provider how often you should have a wellness visit. What happens at a wellness visit:  Your healthcare provider will ask about your health, and your family history of health problems. This includes high blood pressure, heart disease, and cancer. He or she will ask if you have symptoms that concern you, if you smoke, and about your mood. You may also be asked about your intake of medicines, supplements, food, and alcohol.  Any of the following may be done:  Your weight  will be checked. Your height may also be checked so your body mass index (BMI) can be calculated. Your BMI shows if you are at a healthy weight. Your blood pressure  and heart rate will be checked. Your temperature may also be checked. Blood and urine tests  may be done. Blood tests may be done to check your cholesterol levels. Abnormal cholesterol levels increase your risk for heart disease and stroke. You may also need a blood or urine test to check for diabetes if you are at increased risk. Urine tests may be done to look for signs of an infection or kidney disease. A physical exam  includes checking your heartbeat and lungs with a stethoscope. Your healthcare provider may also check your skin to look for sun damage. Screening tests  may be recommended. A screening test is done to check for diseases that may not cause symptoms. The screening tests you may need depend on your age, gender, family history, and lifestyle habits. For example, colorectal screening may be recommended if you are 48years old or older. Screening tests you need if you are a woman:   A Pap smear  is used to screen for cervical cancer. Pap smears are usually done every 3 to 5 years depending on your age. You may need them more often if you have had abnormal Pap smear test results in the past. Ask your healthcare provider how often you should have a Pap smear. A mammogram  is an x-ray of your breasts to screen for breast cancer. Experts recommend mammograms every 2 years starting at age 48 years. You may need a mammogram at age 52 years or younger if you have an increased risk for breast cancer. Talk to your healthcare provider about when you should start having mammograms and how often you need them. Vaccines you may need:   Get an influenza vaccine  every year. The influenza vaccine protects you from the flu. Several types of viruses cause the flu.  The viruses change over time, so new vaccines are made each year. Get a tetanus-diphtheria (Td) booster vaccine  every 10 years. This vaccine protects you against tetanus and diphtheria. Tetanus is a severe infection that may cause painful muscle spasms and lockjaw. Diphtheria is a severe bacterial infection that causes a thick covering in the back of your mouth and throat. Get a human papillomavirus (HPV) vaccine  if you are female and aged 23 to 32 or male 23 to 24 and never received it. This vaccine protects you from HPV infection. HPV is the most common infection spread by sexual contact. HPV may also cause vaginal, penile, and anal cancers. Get a pneumococcal vaccine  if you are aged 72 years or older. The pneumococcal vaccine is an injection given to protect you from pneumococcal disease. Pneumococcal disease is an infection caused by pneumococcal bacteria. The infection may cause pneumonia, meningitis, or an ear infection. Get a shingles vaccine  if you are 60 or older, even if you have had shingles before. The shingles vaccine is an injection to protect you from the varicella-zoster virus. This is the same virus that causes chickenpox. Shingles is a painful rash that develops in people who had chickenpox or have been exposed to the virus. How to eat healthy:  My Plate is a model for planning healthy meals. It shows the types and amounts of foods that should go on your plate. Fruits and vegetables make up about half of your plate, and grains and protein make up the other half. A serving of dairy is included on the side of your plate. The amount of calories and serving sizes you need depends on your age, gender, weight, and height. Examples of healthy foods are listed below:  Eat a variety of vegetables  such as dark green, red, and orange vegetables. You can also include canned vegetables low in sodium (salt) and frozen vegetables without added butter or sauces.     Eat a variety of fresh fruits , canned fruit in 100% juice, frozen fruit, and dried fruit. Include whole grains. At least half of the grains you eat should be whole grains. Examples include whole-wheat bread, wheat pasta, brown rice, and whole-grain cereals such as oatmeal.    Eat a variety of protein foods such as seafood (fish and shellfish), lean meat, and poultry without skin (turkey and chicken). Examples of lean meats include pork leg, shoulder, or tenderloin, and beef round, sirloin, tenderloin, and extra lean ground beef. Other protein foods include eggs and egg substitutes, beans, peas, soy products, nuts, and seeds. Choose low-fat dairy products such as skim or 1% milk or low-fat yogurt, cheese, and cottage cheese. Limit unhealthy fats  such as butter, hard margarine, and shortening. Exercise:  Exercise at least 30 minutes per day on most days of the week. Some examples of exercise include walking, biking, dancing, and swimming. You can also fit in more physical activity by taking the stairs instead of the elevator or parking farther away from stores. Include muscle strengthening activities 2 days each week. Regular exercise provides many health benefits. It helps you manage your weight, and decreases your risk for type 2 diabetes, heart disease, stroke, and high blood pressure. Exercise can also help improve your mood. Ask your healthcare provider about the best exercise plan for you. General health and safety guidelines:   Do not smoke. Nicotine and other chemicals in cigarettes and cigars can cause lung damage. Ask your healthcare provider for information if you currently smoke and need help to quit. E-cigarettes or smokeless tobacco still contain nicotine. Talk to your healthcare provider before you use these products. Limit alcohol. A drink of alcohol is 12 ounces of beer, 5 ounces of wine, or 1½ ounces of liquor. Lose weight, if needed. Being overweight increases your risk of certain health conditions.  These include heart disease, high blood pressure, type 2 diabetes, and certain types of cancer. Protect your skin. Do not sunbathe or use tanning beds. Use sunscreen with a SPF 15 or higher. Apply sunscreen at least 15 minutes before you go outside. Reapply sunscreen every 2 hours. Wear protective clothing, hats, and sunglasses when you are outside. Drive safely. Always wear your seatbelt. Make sure everyone in your car wears a seatbelt. A seatbelt can save your life if you are in an accident. Do not use your cell phone when you are driving. This could distract you and cause an accident. Pull over if you need to make a call or send a text message. Practice safe sex. Use latex condoms if are sexually active and have more than one partner. Your healthcare provider may recommend screening tests for sexually transmitted infections (STIs). Wear helmets, lifejackets, and protective gear. Always wear a helmet when you ride a bike or motorcycle, go skiing, or play sports that could cause a head injury. Wear protective equipment when you play sports. Wear a lifejacket when you are on a boat or doing water sports. © Copyright [facility] 2023 Information is for End User's use only and may not be sold, redistributed or otherwise used for commercial purposes. The above information is an  only. It is not intended as medical advice for individual conditions or treatments. Talk to your doctor, nurse or pharmacist before following any medical regimen to see if it is safe and effective for you. Kegel Exercises for Women   AMBULATORY CARE:   Kegel exercises  help strengthen your pelvic muscles. Pelvic muscles hold your pelvic organs, such as your bladder and uterus, in place. Kegel exercises help prevent or control certain conditions, such as urine incontinence (leakage) or uterine prolapse. Call your doctor or physical therapist if:   You cannot feel your muscles tighten or relax. You continue to leak urine.     You have questions or concerns about your condition or care. Use the correct muscles:  Pelvic muscles are the muscles you use to control urine flow. To target these muscles, stop and start the flow of urine several times. This will help you become familiar with how it feels to tighten and relax these muscles. How to do Kegel exercises:   Get into a comfortable position. You may lie down, stand up, or sit down to do these exercises. When you first try to do these exercises, it may be easier if you lie down. Tighten or squeeze your pelvic muscles slowly. It may feel like you are trying to hold back urine or gas. Hold this position for 3 seconds. Relax for 3 seconds. Repeat this cycle 10 times. Do not hold your breath when you do Kegel exercises. Keep your stomach, back, and leg muscles relaxed. Do 10 sets of Kegel exercises, at least 3 times a day. When you know how to do Kegel exercises, use different positions. This will help to strengthen your pelvic muscles as much as possible. You can do these exercises while you lie on the floor, watch TV, or while you stand. Tighten your pelvic muscles before you sneeze, cough, or lift to prevent urine leakage. You may notice improved bladder control within about 6 weeks. Follow up with your doctor or physical therapist as directed:  Write down your questions so you remember to ask them during your visits. © Copyright [employer] 2023 Information is for End User's use only and may not be sold, redistributed or otherwise used for commercial purposes. The above information is an  only. It is not intended as medical advice for individual conditions or treatments. Talk to your doctor, nurse or pharmacist before following any medical regimen to see if it is safe and effective for you.        Perineal Hygiene      Your vaginal naturally takes care of its self, it is a self washing system, the less you mess the healthier it will be     No soaps or feminine wash to the vulva, these products can cause dermitis, bacterial infections and other vulvar problems. Use only water to cleanse, or water with Dove or [employer] if necessary. No scented lotions or products are advised in or near your vulva. Use only coconut oil for moisture if needed. No douching this may cause imbalance in your vaginal PH and further issues. If you wear panty liners, you may apply a thin coating of Vaseline, A&D ointment or coconut oil to the vulvar tissues as a skin barrier     [place] underware, loose fitting clothing  Only perfume-free, dye-free laundry detergent, use a second rinse cycle   Avoid fabric softeners/dryer sheets. Partner should avoid the same products as well. Over the counter probiotic to restore vaginal anali may be helpful as well, take daily. You may also look into Boric Acid vaginal suppositories to restore vaginal PH balance for up to 2 weeks as directed on the box. You may not use these if you are pregnant      For vaginal dryness: You may use:     Coconut oil (organic, pure, unscented) as needed for moisture or lubrication. ( Do not use if allergic)       Replens moisture restore external comfort gel daily ( use as directed on the box)        Replens long lasting vaginal moisturizer  ( use as directed on the box)         For Vaginal Lubrication:          You may use:     Coconut oil (organic, pure, unscented) as a lubricant or another scent-free lubricant (Astroglide, Uberlube) if needed. Do not use coconut oil or silicone if using a condom as this may break down the integrity of the condom and cause an unplanned pregnancy              Do not use coconut oil if allergic               Replens silky smooth lubricant, premium silicone based lubricant for intercourse. ( use as directed, a small amount will provide an enhanced natural feeling)     Any premium over the counter vaginal lubricant water or silicone based.  Silicone based will have more staying power. Menopause   WHAT YOU NEED TO KNOW:   What is menopause? Menopause is a normal stage in a person's life when monthly periods stop. You are considered to be in menopause when you have not had a period for a full year after the age of 36. Menopause usually occurs between ages 52 to 48. Perimenopause is a stage before menopause that may cause signs and symptoms similar to menopause. Perimenopause may start about 4 years before menopause. What causes menopause? Menopause starts when the ovaries stop making the female hormones estrogen and progesterone. After menopause, you are no longer able to become pregnant. Any of the following may trigger menopause or early menopause:  Surgery, including a hysterectomy or oophorectomy    Family history of early menopause    Smoking    Chemotherapy or pelvic radiation    Chromosome abnormalities, including Reilly syndrome and Fragile X syndrome    Premature ovarian insufficiency (the ovaries stop producing eggs before age 36)    What are the signs and symptoms of menopause? Irregular menstrual cycles with heavy vaginal bleeding followed by decreased bleeding until it stops    Hot flashes (feeling warm, flushed, and sweaty)    Vaginal changes such as increased dryness    Mood changes such as anxiety, depression, or decreased desire to have sex    Trouble sleeping, joint pain, headaches    Brittle nails, hair on chin or chest where it is normally absent    Decrease in breast size and change in skin texture    Weight gain    How is menopause treated or managed? Hormone replacement therapy (HRT) is medicine that replaces your low hormone levels. HRT contains estrogen and sometimes progestin. HRT has several benefits. HRT helps prevent osteoporosis, which decreases your risk for bone fractures. HRT also protects you from colorectal cancer. HRT also has some risks.   HRT increases your risk for breast cancer, blood clots, heart disease, a heart attack, or a stroke. If you are 72 years or older, HRT can also increase your risk for dementia. Your risk for uterine or endometrial cancer, gallbladder disease, and urinary incontinence is higher if you take estrogen-only HRT. Manage hot flashes. Hot flashes are brief periods of feeling very warm, flushed, and sweaty. Hot flashes can last from a few seconds to several minutes. They may happen many times during the day, and are common at night. Layer your clothing so that you can easily remove some clothing and cool yourself during a hot flash. Cold drinks may also be helpful. Non-hormone medicines can help relieve or prevent hot flashes. Examples include certain antidepressants, nerve medicines, and high blood pressure medicines. Reduce vaginal dryness by using over-the-counter vaginal creams. Vaginal dryness may cause you to have pain or discomfort during sex. Only use creams that are made for vaginal use. Do  not  use petroleum jelly. You may put an estrogen cream in and around your vagina. Estrogen cream may help decrease vaginal dryness and lower your risk of vaginal infections. Continue to use birth control during perimenopause if you do not want to get pregnant. You may need to use birth control until it has been 1 year since your periods stopped. Ask your healthcare provider when you can stop using birth control to prevent pregnancy. How can I live a healthy lifestyle during and after menopause? After menopause, your risk for heart disease and bone loss increases. Ask about these and other ways to stay healthy:  Exercise regularly. Exercise helps you maintain a healthy weight. Exercise can also help to control your blood pressure and cholesterol levels. Include weight-bearing exercise for strong bones. Weight bearing exercise is recommended for at least 30 minutes, 3 times a week. Ask your healthcare provider about the best exercise plan for you. Eat a variety of healthy foods. Include fruits, vegetables, whole grains (whole-wheat bread, pasta, and cereals), low-fat dairy, and lean protein foods (beans, poultry, and fish). Limit foods high in sodium (salt). Ask your healthcare provider for more information about a meal plan that is right for you. Do not smoke. If you smoke, it is never too late to quit. You are more likely to have a heart attack, lung disease, blood clots, and cancer if you smoke. Ask your healthcare provider for information if you need help quitting. Take supplements as directed. You may need extra calcium and vitamin D to help prevent osteoporosis. Limit alcohol and caffeine. Alcohol and caffeine may worsen your symptoms. When should I call my doctor? You have vaginal bleeding after menopause. You have questions or concerns about your condition or care. CARE AGREEMENT:   You have the right to help plan your care. Learn about your health condition and how it may be treated. Discuss treatment options with your healthcare providers to decide what care you want to receive. You always have the right to refuse treatment. The above information is an  only. It is not intended as medical advice for individual conditions or treatments. Talk to your doctor, nurse or pharmacist before following any medical regimen to see if it is safe and effective for you. © Copyright Dar[employer] 2023 Information is for End User's use only and may not be sold, redistributed or otherwise used for commercial purposes.

## 2023-11-07 ENCOUNTER — ANNUAL EXAM (OUTPATIENT)
Dept: OBGYN CLINIC | Facility: CLINIC | Age: 43
End: 2023-11-07
Payer: COMMERCIAL

## 2023-11-07 VITALS
HEIGHT: 66 IN | BODY MASS INDEX: 33.27 KG/M2 | SYSTOLIC BLOOD PRESSURE: 120 MMHG | WEIGHT: 207 LBS | DIASTOLIC BLOOD PRESSURE: 76 MMHG

## 2023-11-07 DIAGNOSIS — Z01.419 ENCOUNTER FOR GYNECOLOGICAL EXAMINATION WITHOUT ABNORMAL FINDING: Primary | ICD-10-CM

## 2023-11-07 DIAGNOSIS — Z12.31 ENCOUNTER FOR SCREENING MAMMOGRAM FOR MALIGNANT NEOPLASM OF BREAST: ICD-10-CM

## 2023-11-07 DIAGNOSIS — Z12.11 COLON CANCER SCREENING: ICD-10-CM

## 2023-11-07 PROBLEM — R73.03 PREDIABETES: Status: ACTIVE | Noted: 2023-05-30

## 2023-11-07 PROCEDURE — 99396 PREV VISIT EST AGE 40-64: CPT | Performed by: OBSTETRICS & GYNECOLOGY

## 2023-11-30 ENCOUNTER — HOSPITAL ENCOUNTER (OUTPATIENT)
Age: 43
Discharge: HOME | End: 2023-11-30
Payer: COMMERCIAL

## 2023-11-30 VITALS — WEIGHT: 207 LBS | HEIGHT: 66 IN | BODY MASS INDEX: 33.27 KG/M2

## 2023-11-30 DIAGNOSIS — Z12.31 SCREENING MAMMOGRAM, ENCOUNTER FOR: ICD-10-CM

## 2023-11-30 PROCEDURE — 77063 BREAST TOMOSYNTHESIS BI: CPT

## 2023-11-30 PROCEDURE — 77067 SCR MAMMO BI INCL CAD: CPT

## 2024-08-07 ENCOUNTER — TELEPHONE (OUTPATIENT)
Age: 44
End: 2024-08-07

## 2024-08-21 ENCOUNTER — OFFICE VISIT (OUTPATIENT)
Dept: GASTROENTEROLOGY | Facility: CLINIC | Age: 44
End: 2024-08-21
Payer: COMMERCIAL

## 2024-08-21 VITALS
OXYGEN SATURATION: 100 % | WEIGHT: 200.8 LBS | DIASTOLIC BLOOD PRESSURE: 81 MMHG | SYSTOLIC BLOOD PRESSURE: 117 MMHG | BODY MASS INDEX: 32.27 KG/M2 | TEMPERATURE: 97.7 F | HEIGHT: 66 IN | HEART RATE: 58 BPM

## 2024-08-21 DIAGNOSIS — Z80.0 FAMILY HISTORY OF COLON CANCER: Primary | ICD-10-CM

## 2024-08-21 PROCEDURE — 99203 OFFICE O/P NEW LOW 30 MIN: CPT | Performed by: INTERNAL MEDICINE

## 2024-08-21 NOTE — PROGRESS NOTES
[facility] - Outpatient Consultation  Iwona Meng 44 y.o. female MRN: [id number]  Encounter: [id number]          ASSESSMENT AND PLAN:      1. Family history of colon cancer  - Colonoscopy; Future    ______________________________________________________________________    HPI:  Iwona is a 44-year-old female who comes the office today for a planned screening colonoscopy.  She does admit to a family history for colon cancer involving her paternal aunt.  She denies a change in bowel habits, rectal bleeding, diarrhea, constipation, abdominal pain, nausea, vomiting, heartburn, dysphagia, bloating, but she does admit to gaseousness.  She does have a prior history of pancreatitis.  She has a bowel movement generally on a daily basis.      REVIEW OF SYSTEMS:    CONSTITUTIONAL: Denies any fever, chills, rigors, and weight loss.  HEENT: No earache or tinnitus. Denies hearing loss or visual disturbances.  CARDIOVASCULAR: No chest pain or palpitations.   RESPIRATORY: Denies any cough, hemoptysis, shortness of breath or dyspnea on exertion.  GASTROINTESTINAL: As noted in the History of Present Illness.   GENITOURINARY: No problems with urination. Denies any hematuria or dysuria.  NEUROLOGIC: No dizziness or vertigo, denies headaches.   MUSCULOSKELETAL: Denies any muscle or joint pain.   SKIN: Denies skin rashes or itching.   ENDOCRINE: Denies excessive thirst. Denies intolerance to heat or cold.  PSYCHOSOCIAL: Denies depression or anxiety. Denies any recent memory loss.       Historical Information   Past Medical History:   Diagnosis Date    No known health problems     Pancreatitis 2019    Varicella      Past Surgical History:   Procedure Laterality Date     SECTION  2005    CHOLECYSTECTOMY      TUBAL LIGATION       Social History   Social History     Substance and Sexual Activity   Alcohol Use Yes    Alcohol/week: 1.0 standard drink of alcohol    Types: 1 Glasses of wine  "per week    Comment: social     Social History     Substance and Sexual Activity   Drug Use Never     Social History     Tobacco Use   Smoking Status Never   Smokeless Tobacco Never     Family History   Problem Relation Age of Onset    Breast cancer Mother 63    No Known Problems Son     No Known Problems Son     No Known Problems Maternal Grandmother     No Known Problems Paternal Grandmother     No Known Problems Maternal Aunt     No Known Problems Maternal Aunt     Colon cancer Paternal Aunt     No Known Problems Paternal Aunt     No Known Problems Paternal Aunt     Ovarian cancer Neg Hx     Uterine cancer Neg Hx     Cervical cancer Neg Hx        Meds/Allergies       Current Outpatient Medications:     Multiple Vitamins-Minerals (WOMENS MULTIVITAMIN PLUS PO)    No Known Allergies        Objective     Blood pressure 117/81, pulse 58, temperature 97.7 °F (36.5 °C), temperature source Temporal, height 5' 6\" (1.676 m), weight 91.1 kg (200 lb 12.8 oz), SpO2 100%, not currently breastfeeding. Body mass index is 32.41 kg/m².        PHYSICAL EXAM:      General Appearance:   Alert, cooperative, no distress   HEENT:   Normocephalic, atraumatic, anicteric.     Neck:  Supple, symmetrical, trachea midline   Lungs:   Clear to auscultation bilaterally; no rales, rhonchi or wheezing; respirations unlabored    Heart::   Regular rate and rhythm; no murmur, rub, or gallop.   Abdomen:   Soft, non-tender, non-distended; normal bowel sounds; no masses, no organomegaly    Genitalia:   Deferred    Rectal:   Deferred    Extremities:  No cyanosis, clubbing or edema    Pulses:  2+ and symmetric    Skin:  No jaundice, rashes, or lesions    Lymph nodes:  No palpable cervical lymphadenopathy        Lab Results:   No visits with results within 1 Day(s) from this visit.   Latest known visit with results is:   Annual Exam on 07/19/2021   Component Date Value    Case Report 07/19/2021                      Value:Gynecologic Cytology Report         "               Case: [id number]                                  Authorizing Provider:  JOSEPH Garcia       Collected:           07/19/2021 1034              Ordering Location:     [facility]     Received:            07/19/2021 1034                                     [facility]                                                                               [place]                                                                       First Screen:          [place], [place]                                                         Specimen:    LIQUID-BASED PAP, SCREENING, Cervix                                                        Primary Interpretation 07/19/2021 Negative for intraepithelial lesion or malignancy     Specimen Adequacy 07/19/2021 Satisfactory for evaluation. Absence of endocervical/transformation zone component.     Additional Information 07/19/2021                      Value:[employer]'s FDA approved ,  and ThinPrep Imaging Duo System are                           utilized with strict adherence to the 's instruction manual to                           prepare gynecologic and non-gynecologic cytology specimens for the                           production of ThinPrep slides as well as for gynecologic ThinPrep imaging.                           These processes have been validated by our laboratory and/or by the                           .                          The Pap test is not a diagnostic procedure and should not be used as the                           sole means to detect cervical cancer. It is only a screening procedure to                           aid in the detection of cervical cancer and its precursors. Both                           false-negative and false-positive results have been experienced. Your                           patient's test result should be interpreted in this context together with                           the  history and clinical findings.    HPV Other HR 07/19/2021 Negative     HPV16 07/19/2021 Negative     HPV18 07/19/2021 Negative          Radiology Results:   No results found.

## 2024-08-21 NOTE — PATIENT INSTRUCTIONS
Scheduled date of colonoscopy (as of today):10/7/24  Physician performing colonoscopy:Isabela  Location of colonoscopy:[place]  Bowel prep reviewed with patient:Dawit/Miralax  Instructions reviewed with patient by:Obinna page  Clearances:  none

## 2024-10-07 ENCOUNTER — ANESTHESIA (OUTPATIENT)
Dept: GASTROENTEROLOGY | Facility: HOSPITAL | Age: 44
End: 2024-10-07
Payer: COMMERCIAL

## 2024-10-07 ENCOUNTER — ANESTHESIA EVENT (OUTPATIENT)
Dept: GASTROENTEROLOGY | Facility: HOSPITAL | Age: 44
End: 2024-10-07
Payer: COMMERCIAL

## 2024-10-07 ENCOUNTER — HOSPITAL ENCOUNTER (OUTPATIENT)
Dept: GASTROENTEROLOGY | Facility: HOSPITAL | Age: 44
Setting detail: OUTPATIENT SURGERY
Discharge: HOME | End: 2024-10-07
Attending: INTERNAL MEDICINE
Payer: COMMERCIAL

## 2024-10-07 VITALS
OXYGEN SATURATION: 99 % | WEIGHT: 199.3 LBS | SYSTOLIC BLOOD PRESSURE: 130 MMHG | BODY MASS INDEX: 32.03 KG/M2 | RESPIRATION RATE: 14 BRPM | TEMPERATURE: 97.7 F | HEART RATE: 56 BPM | DIASTOLIC BLOOD PRESSURE: 77 MMHG | HEIGHT: 66 IN

## 2024-10-07 DIAGNOSIS — Z80.0 FAMILY HISTORY OF COLON CANCER: ICD-10-CM

## 2024-10-07 LAB
EXT PREGNANCY TEST URINE: NEGATIVE
EXT. CONTROL: NORMAL

## 2024-10-07 PROCEDURE — 81025 URINE PREGNANCY TEST: CPT | Performed by: ANESTHESIOLOGY

## 2024-10-07 PROCEDURE — G0121 COLON CA SCRN NOT HI RSK IND: HCPCS | Performed by: INTERNAL MEDICINE

## 2024-10-07 RX ORDER — PROPOFOL 10 MG/ML
INJECTION, EMULSION INTRAVENOUS AS NEEDED
Status: DISCONTINUED | OUTPATIENT
Start: 2024-10-07 | End: 2024-10-07

## 2024-10-07 RX ORDER — SODIUM CHLORIDE, SODIUM LACTATE, POTASSIUM CHLORIDE, CALCIUM CHLORIDE 600; 310; 30; 20 MG/100ML; MG/100ML; MG/100ML; MG/100ML
INJECTION, SOLUTION INTRAVENOUS CONTINUOUS PRN
Status: DISCONTINUED | OUTPATIENT
Start: 2024-10-07 | End: 2024-10-07

## 2024-10-07 RX ADMIN — PROPOFOL 50 MG: 10 INJECTION, EMULSION INTRAVENOUS at 09:21

## 2024-10-07 RX ADMIN — PROPOFOL 50 MG: 10 INJECTION, EMULSION INTRAVENOUS at 09:23

## 2024-10-07 RX ADMIN — SODIUM CHLORIDE, SODIUM LACTATE, POTASSIUM CHLORIDE, AND CALCIUM CHLORIDE: .6; .31; .03; .02 INJECTION, SOLUTION INTRAVENOUS at 09:15

## 2024-10-07 RX ADMIN — PROPOFOL 100 MG: 10 INJECTION, EMULSION INTRAVENOUS at 09:19

## 2024-10-07 RX ADMIN — PROPOFOL 50 MG: 10 INJECTION, EMULSION INTRAVENOUS at 09:26

## 2024-10-07 NOTE — H&P
"History and Physical -  [facility]  Iwona Meng 44 y.o. female MRN: [id number]      HPI: Iwona Meng is a 44 y.o. year old female who presents for family history for colon cancer      REVIEW OF SYSTEMS: Per the HPI, and otherwise unremarkable.    Historical Information   Past Medical History:   Diagnosis Date    No known health problems     Pancreatitis 2019    Varicella      Past Surgical History:   Procedure Laterality Date     SECTION  2005    CHOLECYSTECTOMY  2019    TUBAL LIGATION       Social History   Social History     Substance and Sexual Activity   Alcohol Use Yes    Alcohol/week: 1.0 standard drink of alcohol    Types: 1 Glasses of wine per week    Comment: social     Social History     Substance and Sexual Activity   Drug Use Never     Social History     Tobacco Use   Smoking Status Never   Smokeless Tobacco Never     Family History   Problem Relation Age of Onset    Breast cancer Mother 63    No Known Problems Son     No Known Problems Son     No Known Problems Maternal Grandmother     No Known Problems Paternal Grandmother     No Known Problems Maternal Aunt     No Known Problems Maternal Aunt     Colon cancer Paternal Aunt     No Known Problems Paternal Aunt     No Known Problems Paternal Aunt     Ovarian cancer Neg Hx     Uterine cancer Neg Hx     Cervical cancer Neg Hx        Meds/Allergies     Not in a hospital admission.    No Known Allergies    Objective     Blood pressure 121/82, pulse 60, temperature 97.9 °F (36.6 °C), temperature source Temporal, resp. rate 18, height 5' 6\" (1.676 m), weight 90.4 kg (199 lb 4.7 oz), last menstrual period 2024, SpO2 98%, not currently breastfeeding.      PHYSICAL EXAM    Gen: NAD  CV: RRR  CHEST: Clear  ABD: soft, NT/ND  EXT: no edema      ASSESSMENT/PLAN:  This is a 44 y.o. year old female here for colonoscopy, and she is stable and optimized for her procedure.          " Pt is in town for business. States he has \"body aches all over\" and fever.

## 2024-10-07 NOTE — ANESTHESIA PREPROCEDURE EVALUATION
"\"44-year-old female who comes the office today for a planned screening colonoscopy. She does admit to a family history for colon cancer involving her paternal aunt. She denies a change in bowel habits, rectal bleeding, diarrhea, constipation, abdominal pain, nausea, vomiting, heartburn, dysphagia, bloating, but she does admit to gaseousness. She does have a prior history of pancreatitis. She has a bowel movement generally on a daily basis. \"    Procedure:  COLONOSCOPY    Relevant Problems   No relevant active problems        Physical Exam    Airway    Mallampati score: II  TM Distance: >3 FB  Neck ROM: full     Dental   No notable dental hx     Cardiovascular  Rhythm: regular, Rate: normal, No weak pulses    Pulmonary   No stridor    Other Findings  post-pubertal.      Anesthesia Plan  ASA Score- 2     Anesthesia Type- IV sedation with anesthesia with ASA Monitors.         Additional Monitors:     Airway Plan:            Plan Factors-    Chart reviewed.   Existing labs reviewed. Patient summary reviewed.                  Induction- intravenous.    Postoperative Plan-         Informed Consent- Anesthetic plan and risks discussed with patient.  I personally reviewed this patient with the CRNA. Discussed and agreed on the Anesthesia Plan with the CRNA..        "

## 2024-10-07 NOTE — ANESTHESIA POSTPROCEDURE EVALUATION
Post-Op Assessment Note    CV Status:  Stable    Pain management: adequate       Mental Status:  Sleepy and arousable   Hydration Status:  Euvolemic   PONV Controlled:  Controlled   Airway Patency:  Patent  Two or more mitigation strategies used for obstructive sleep apnea   Post Op Vitals Reviewed: Yes    No anethesia notable event occurred.    Staff: Anesthesiologist, CRNA               /59 (10/07/24 0933)    Temp 97.7 °F (36.5 °C) (10/07/24 0933)    Pulse 72 (10/07/24 0933)   Resp 18 (10/07/24 0933)    SpO2 98 % (10/07/24 0933)

## 2025-04-29 ENCOUNTER — TELEPHONE (OUTPATIENT)
Age: 45
End: 2025-04-29